# Patient Record
Sex: MALE | Race: WHITE
[De-identification: names, ages, dates, MRNs, and addresses within clinical notes are randomized per-mention and may not be internally consistent; named-entity substitution may affect disease eponyms.]

---

## 2020-03-01 ENCOUNTER — HEALTH MAINTENANCE LETTER (OUTPATIENT)
Age: 30
End: 2020-03-01

## 2020-12-14 ENCOUNTER — HEALTH MAINTENANCE LETTER (OUTPATIENT)
Age: 30
End: 2020-12-14

## 2021-04-18 ENCOUNTER — HEALTH MAINTENANCE LETTER (OUTPATIENT)
Age: 31
End: 2021-04-18

## 2021-10-02 ENCOUNTER — HEALTH MAINTENANCE LETTER (OUTPATIENT)
Age: 31
End: 2021-10-02

## 2022-01-31 ENCOUNTER — NURSE TRIAGE (OUTPATIENT)
Dept: NURSING | Facility: CLINIC | Age: 32
End: 2022-01-31
Payer: COMMERCIAL

## 2022-01-31 NOTE — TELEPHONE ENCOUNTER
S-(situation): Call from patient sustained a dog bite when he helped separate two dogs      B-(background): Last Tetanus was in 2012      A-(assessment): needs booster & evaluation      R-(recommendations): be seen within 24 hrs    Reviewed care advice with caller per RN triage protocol guideline.  Advised to call back with worsening symptoms, concerns or questions.   Caller verbalized understanding.          Jose G Irwin RN/[facility]    Reason for Disposition    [1] No prior tetanus shots (or is not fully vaccinated) AND [2] any wound (e.g., cut, scrape)    Additional Information    Negative: [1] Major bleeding (e.g., actively dripping or spurting) AND [2] can't be stopped    Negative: Sounds like a life-threatening emergency to the triager    Negative: [1] Any break in skin from BITE (e.g., cut, puncture or scratch) AND[2] WILD animal at risk for RABIES (e.g., bat, raccoon, long, skunk, coyote, other carnivores)    Negative: [1] Any break in skin from BITE (e.g., cut, puncture or scratch) AND[2] PET animial (e.g., dog, cat, or ferret) at risk for RABIES (e.g., sick, stray, unprovoked bite, developing country)    Negative: [1] Any break in skin from BITE (e.g., cut, puncture or scratch) AND[2] monkey    Negative: [1] EXPOSURE of non-intact skin (e.g., exposed person has dermatitis, abrasion, wound) AND[2] with animal BODY FLUID (e.g., saliva such as licking, blood, brain) AND[3] animal at high-risk for RABIES (e.g., bat, raccoon, long, skunk, coyote, other carnivores)    Negative: [1] Cut (length > 1/8 inch or 3 mm) or skin tear AND [2] any animal    Negative: [1] Bleeding AND [2] won't stop after 10 minutes of direct pressure (using correct technique)    Negative: Description of bite sounds severe to the triager    Negative: [1] Puncture wound (hole through the skin) AND [2] from a cat bite (or deep claw puncture wound)    Negative: [1] Puncture wound or small cut AND [2] on face    Negative: [1]  Puncture wound or small cut AND [2] on hands or genitals    Negative: [1] Puncture wound or small cut AND [2] weak immune system (e.g., HIV positive, cancer chemo, splenectomy, organ transplant, chronic steroids)    Negative: Looks infected (red area, red streak, or pus)    Negative: [1] No bite sharon or scratch AND [2] suspected bat exposure (e.g., bat found in same room as sleeping adult)    Negative: [1] Taking antibiotic > 24 hours for infected bite AND [2] bite getting worse (more swollen, more redness and increased pain)    Negative: [1] Taking antibiotic > 48 hours (2 days) for infected bite AND [2] fever persists    Negative: [1] Taking antibiotic > 72 hours (3 days) for infected bite AND [2] has not improved (i.e., pain, pus, redness)    Protocols used: ANIMAL BITE-A-

## 2022-05-14 ENCOUNTER — HEALTH MAINTENANCE LETTER (OUTPATIENT)
Age: 32
End: 2022-05-14

## 2022-09-03 ENCOUNTER — HEALTH MAINTENANCE LETTER (OUTPATIENT)
Age: 32
End: 2022-09-03

## 2023-06-03 ENCOUNTER — HEALTH MAINTENANCE LETTER (OUTPATIENT)
Age: 33
End: 2023-06-03